# Patient Record
Sex: MALE | Race: WHITE
[De-identification: names, ages, dates, MRNs, and addresses within clinical notes are randomized per-mention and may not be internally consistent; named-entity substitution may affect disease eponyms.]

---

## 2017-07-23 ENCOUNTER — HOSPITAL ENCOUNTER (EMERGENCY)
Dept: HOSPITAL 54 - ER | Age: 27
Discharge: HOME | End: 2017-07-23
Payer: MEDICAID

## 2017-07-23 VITALS — SYSTOLIC BLOOD PRESSURE: 146 MMHG | DIASTOLIC BLOOD PRESSURE: 100 MMHG

## 2017-07-23 VITALS — BODY MASS INDEX: 26.68 KG/M2 | WEIGHT: 170 LBS | HEIGHT: 67 IN

## 2017-07-23 DIAGNOSIS — B35.3: ICD-10-CM

## 2017-07-23 DIAGNOSIS — L03.116: Primary | ICD-10-CM

## 2017-07-23 PROCEDURE — Z7610: HCPCS

## 2017-07-23 PROCEDURE — A4606 OXYGEN PROBE USED W OXIMETER: HCPCS

## 2019-03-09 ENCOUNTER — HOSPITAL ENCOUNTER (EMERGENCY)
Dept: HOSPITAL 54 - ER | Age: 29
Discharge: HOME | End: 2019-03-09
Payer: MEDICAID

## 2019-03-09 VITALS — DIASTOLIC BLOOD PRESSURE: 108 MMHG | SYSTOLIC BLOOD PRESSURE: 167 MMHG

## 2019-03-09 VITALS — WEIGHT: 198.03 LBS | HEIGHT: 67 IN | BODY MASS INDEX: 31.08 KG/M2

## 2019-03-09 DIAGNOSIS — B07.8: Primary | ICD-10-CM

## 2019-03-09 PROCEDURE — 99283 EMERGENCY DEPT VISIT LOW MDM: CPT

## 2021-04-07 ENCOUNTER — HOSPITAL ENCOUNTER (EMERGENCY)
Dept: HOSPITAL 54 - ER | Age: 31
Discharge: HOME | End: 2021-04-07
Payer: MEDICAID

## 2021-04-07 VITALS
BODY MASS INDEX: 31.08 KG/M2 | DIASTOLIC BLOOD PRESSURE: 93 MMHG | SYSTOLIC BLOOD PRESSURE: 138 MMHG | WEIGHT: 198 LBS | HEIGHT: 67 IN

## 2021-04-07 DIAGNOSIS — L03.115: Primary | ICD-10-CM

## 2021-04-07 PROCEDURE — 99283 EMERGENCY DEPT VISIT LOW MDM: CPT

## 2021-04-07 PROCEDURE — 96372 THER/PROPH/DIAG INJ SC/IM: CPT

## 2021-04-07 NOTE — NUR
PATIENT CAME TO ER BED 9 C/O LEFT LEG REDNESS WITH SWELLING SINCE FRIDAY WHEN 
HE GOT BIT BY A SPIDER. NO BITE MARKS NOTED. PATIENT IS AMBULATORY WITH A 
STEADY GAIT. PATIENT IS [id number]. NOT IN ANY DISTRESS. DENIES SOB. VSS.